# Patient Record
(demographics unavailable — no encounter records)

---

## 2025-03-07 NOTE — HISTORY OF PRESENT ILLNESS
[de-identified] : 24yo F BMI 49 presenting asking for GLP1 medications. Patient says she is very hesitant about bariatric surgery. She did a consultation a few years ago and almost went through the process but then said she got scared.   She has tried numerous attempts at diet and exercise and has been unable to lose weight.   Her PCP recently prescribed a medication (she doesn't remember the name) for hypertension, however she hasn't started it yet. Today in the office her bp is only 123/81 and patient reports that at home it has been measuring not too high as well.

## 2025-03-07 NOTE — PLAN
[FreeTextEntry1] : I reviewed full set of labs from Nov 2024, no need to repeat anything at this moment  discussed with patient that her insurance does not cover GLP1 Radha and she is only barely prediabetic so cannot get them for that indication either discussed that her best option is bariatric surgery, however she is hesitant at this time  Pt interested in using Phentermine for weight loss. Pt denies history of cardiovascular disease (eg, arrhythmias, heart failure, coronary artery disease, stroke, uncontrolled hypertension); hyperthyroidism; glaucoma; agitated states; history of drug abuse; MAO inhibitor therapy; pregnancy; breast-feeding.   Will start with phentermine 37.5mg but instructed patient to break it in half for the first 3-4 days at least and then if tolerating that, ok to increase to 1 full tablet per day.   Informed pt of possible adverse effects from Phentermine: elevation in blood pressure, palpitations, tachycardia, dizziness, dysphoria, euphoria, headache, insomnia, psychosis, restlessness, constipation, diarrhea, gastrointestinal distress, unpleasant taste, xerostomia, tremor.  If applicable, informed pt they should not get pregnant while on this medication and pt understands and is using contraception if appropriate.  counseled patient on focusing on high protein diet- 60-80g a day of lean protein, focusing on high fiber intake- fruits and veggies counseled patient on regular exercise- to include cardio and lifting to build lean muscle counseled patient on drinking enough water throughout the day- 64oz+ advised patient to call if develops any side effects from medication or has any questions showed patient how to use [facility] if needed to pay for medication at the pharmacy emailed patient the information sheet on phentermine from [facility] follow up in 1 month to review weight loss progress, possible side effects and to refill medication if indicated

## 2025-03-07 NOTE — PHYSICAL EXAM
[Normal] : affect appropriate [de-identified] : unlabored respirations [de-identified] : reguar rate [de-identified] : soft